# Patient Record
Sex: MALE | Race: BLACK OR AFRICAN AMERICAN | NOT HISPANIC OR LATINO | Employment: UNEMPLOYED | URBAN - METROPOLITAN AREA
[De-identification: names, ages, dates, MRNs, and addresses within clinical notes are randomized per-mention and may not be internally consistent; named-entity substitution may affect disease eponyms.]

---

## 2024-10-20 ENCOUNTER — APPOINTMENT (OUTPATIENT)
Dept: GENERAL RADIOLOGY | Facility: HOSPITAL | Age: 44
End: 2024-10-20
Payer: OTHER GOVERNMENT

## 2024-10-20 ENCOUNTER — APPOINTMENT (OUTPATIENT)
Dept: ULTRASOUND IMAGING | Facility: HOSPITAL | Age: 44
End: 2024-10-20
Payer: OTHER GOVERNMENT

## 2024-10-20 ENCOUNTER — HOSPITAL ENCOUNTER (EMERGENCY)
Facility: HOSPITAL | Age: 44
Discharge: HOME | End: 2024-10-20
Attending: EMERGENCY MEDICINE | Admitting: EMERGENCY MEDICINE
Payer: OTHER GOVERNMENT

## 2024-10-20 VITALS
DIASTOLIC BLOOD PRESSURE: 92 MMHG | SYSTOLIC BLOOD PRESSURE: 141 MMHG | TEMPERATURE: 97.4 F | BODY MASS INDEX: 30.05 KG/M2 | HEART RATE: 77 BPM | HEIGHT: 74 IN | WEIGHT: 234.13 LBS | OXYGEN SATURATION: 98 % | RESPIRATION RATE: 16 BRPM

## 2024-10-20 DIAGNOSIS — R10.13 ABDOMINAL PAIN, ACUTE, EPIGASTRIC: Primary | ICD-10-CM

## 2024-10-20 DIAGNOSIS — K21.9 GASTROESOPHAGEAL REFLUX DISEASE, UNSPECIFIED WHETHER ESOPHAGITIS PRESENT: ICD-10-CM

## 2024-10-20 LAB
ALBUMIN SERPL-MCNC: 4.3 G/DL (ref 3.5–5.2)
ALBUMIN/GLOB SERPL: 1.2 G/DL
ALP SERPL-CCNC: 109 U/L (ref 39–117)
ALT SERPL W P-5'-P-CCNC: 20 U/L (ref 1–41)
ANION GAP SERPL CALCULATED.3IONS-SCNC: 8.6 MMOL/L (ref 5–15)
AST SERPL-CCNC: 22 U/L (ref 1–40)
BASOPHILS # BLD AUTO: 0.03 10*3/MM3 (ref 0–0.2)
BASOPHILS NFR BLD AUTO: 0.4 % (ref 0–1.5)
BILIRUB SERPL-MCNC: 0.4 MG/DL (ref 0–1.2)
BUN SERPL-MCNC: 13 MG/DL (ref 6–20)
BUN/CREAT SERPL: 11.1 (ref 7–25)
CALCIUM SPEC-SCNC: 9.6 MG/DL (ref 8.6–10.5)
CHLORIDE SERPL-SCNC: 101 MMOL/L (ref 98–107)
CO2 SERPL-SCNC: 28.4 MMOL/L (ref 22–29)
CREAT SERPL-MCNC: 1.17 MG/DL (ref 0.76–1.27)
DEPRECATED RDW RBC AUTO: 42.8 FL (ref 37–54)
EGFRCR SERPLBLD CKD-EPI 2021: 79.8 ML/MIN/1.73
EOSINOPHIL # BLD AUTO: 0.09 10*3/MM3 (ref 0–0.4)
EOSINOPHIL NFR BLD AUTO: 1.2 % (ref 0.3–6.2)
ERYTHROCYTE [DISTWIDTH] IN BLOOD BY AUTOMATED COUNT: 12.8 % (ref 12.3–15.4)
GLOBULIN UR ELPH-MCNC: 3.6 GM/DL
GLUCOSE SERPL-MCNC: 91 MG/DL (ref 65–99)
HCT VFR BLD AUTO: 43.6 % (ref 37.5–51)
HGB BLD-MCNC: 14.8 G/DL (ref 13–17.7)
HOLD SPECIMEN: NORMAL
HOLD SPECIMEN: NORMAL
IMM GRANULOCYTES # BLD AUTO: 0.02 10*3/MM3 (ref 0–0.05)
IMM GRANULOCYTES NFR BLD AUTO: 0.3 % (ref 0–0.5)
LIPASE SERPL-CCNC: 30 U/L (ref 13–60)
LYMPHOCYTES # BLD AUTO: 3.16 10*3/MM3 (ref 0.7–3.1)
LYMPHOCYTES NFR BLD AUTO: 43.4 % (ref 19.6–45.3)
MAGNESIUM SERPL-MCNC: 2.2 MG/DL (ref 1.6–2.6)
MCH RBC QN AUTO: 30.9 PG (ref 26.6–33)
MCHC RBC AUTO-ENTMCNC: 33.9 G/DL (ref 31.5–35.7)
MCV RBC AUTO: 91 FL (ref 79–97)
MONOCYTES # BLD AUTO: 0.55 10*3/MM3 (ref 0.1–0.9)
MONOCYTES NFR BLD AUTO: 7.6 % (ref 5–12)
NEUTROPHILS NFR BLD AUTO: 3.43 10*3/MM3 (ref 1.7–7)
NEUTROPHILS NFR BLD AUTO: 47.1 % (ref 42.7–76)
NRBC BLD AUTO-RTO: 0 /100 WBC (ref 0–0.2)
NT-PROBNP SERPL-MCNC: <36 PG/ML (ref 0–450)
PLATELET # BLD AUTO: 219 10*3/MM3 (ref 140–450)
PMV BLD AUTO: 10 FL (ref 6–12)
POTASSIUM SERPL-SCNC: 4 MMOL/L (ref 3.5–5.2)
PROT SERPL-MCNC: 7.9 G/DL (ref 6–8.5)
RBC # BLD AUTO: 4.79 10*6/MM3 (ref 4.14–5.8)
SODIUM SERPL-SCNC: 138 MMOL/L (ref 136–145)
TROPONIN T SERPL HS-MCNC: <6 NG/L
WBC NRBC COR # BLD AUTO: 7.28 10*3/MM3 (ref 3.4–10.8)
WHOLE BLOOD HOLD COAG: NORMAL
WHOLE BLOOD HOLD SPECIMEN: NORMAL

## 2024-10-20 PROCEDURE — 71045 X-RAY EXAM CHEST 1 VIEW: CPT

## 2024-10-20 PROCEDURE — 83880 ASSAY OF NATRIURETIC PEPTIDE: CPT | Performed by: EMERGENCY MEDICINE

## 2024-10-20 PROCEDURE — 36415 COLL VENOUS BLD VENIPUNCTURE: CPT | Performed by: EMERGENCY MEDICINE

## 2024-10-20 PROCEDURE — 93005 ELECTROCARDIOGRAM TRACING: CPT | Performed by: EMERGENCY MEDICINE

## 2024-10-20 PROCEDURE — 83690 ASSAY OF LIPASE: CPT | Performed by: EMERGENCY MEDICINE

## 2024-10-20 PROCEDURE — 76705 ECHO EXAM OF ABDOMEN: CPT

## 2024-10-20 PROCEDURE — 99284 EMERGENCY DEPT VISIT MOD MDM: CPT

## 2024-10-20 PROCEDURE — 84484 ASSAY OF TROPONIN QUANT: CPT | Performed by: EMERGENCY MEDICINE

## 2024-10-20 PROCEDURE — 85025 COMPLETE CBC W/AUTO DIFF WBC: CPT | Performed by: EMERGENCY MEDICINE

## 2024-10-20 PROCEDURE — 80053 COMPREHEN METABOLIC PANEL: CPT | Performed by: EMERGENCY MEDICINE

## 2024-10-20 PROCEDURE — 83735 ASSAY OF MAGNESIUM: CPT | Performed by: EMERGENCY MEDICINE

## 2024-10-20 PROCEDURE — 93005 ELECTROCARDIOGRAM TRACING: CPT

## 2024-10-20 RX ORDER — PANTOPRAZOLE SODIUM 20 MG/1
20 TABLET, DELAYED RELEASE ORAL DAILY
Qty: 30 TABLET | Refills: 0 | Status: SHIPPED | OUTPATIENT
Start: 2024-10-20

## 2024-10-20 RX ORDER — ASPIRIN 81 MG/1
324 TABLET, CHEWABLE ORAL ONCE
Status: DISCONTINUED | OUTPATIENT
Start: 2024-10-20 | End: 2024-10-21 | Stop reason: HOSPADM

## 2024-10-20 RX ORDER — SODIUM CHLORIDE 0.9 % (FLUSH) 0.9 %
10 SYRINGE (ML) INJECTION AS NEEDED
Status: DISCONTINUED | OUTPATIENT
Start: 2024-10-20 | End: 2024-10-21 | Stop reason: HOSPADM

## 2024-10-20 NOTE — ED TRIAGE NOTES
Pt to ED from Urgent Care with reports of epigastric pain every time he eats.      Pt states pain began today.    Pt had EKG and aspirin per Urgent Care and was referred to ED for further eval.

## 2024-10-21 LAB
QT INTERVAL: 347 MS
QTC INTERVAL: 423 MS

## 2024-10-21 NOTE — ED PROVIDER NOTES
"Time: 8:34 PM EDT  Date of encounter:  10/20/2024  Independent Historian/Clinical History and Information was obtained by:   Patient    History is limited by: N/A    Chief Complaint: Abdominal pain      History of Present Illness:  Patient is a 44 y.o. year old male who presents to the emergency department for evaluation of upper abdominal pain.  Patient reports he has been experiencing this upper abdominal pain intermittently for the last couple of years.  Patient reports these episodes are usually brought on by eating \"really bad foods\".  Patient denies nausea or vomiting, denies diarrhea, but reports constipation and an increase in gas.  Patient reports the pain is in the upper middle area of his abdomen, reports it feels like it becomes hard and \"squeezing\".      Patient Care Team  Primary Care Provider: Provider, No Known    Past Medical History:     No Known Allergies  Past Medical History:   Diagnosis Date    GERD (gastroesophageal reflux disease)      History reviewed. No pertinent surgical history.  History reviewed. No pertinent family history.    Home Medications:  Prior to Admission medications    Not on File        Social History:          Review of Systems:  Review of Systems   Constitutional:  Negative for activity change, appetite change and fever.   HENT:  Negative for congestion and sore throat.    Eyes: Negative.    Respiratory:  Negative for cough and shortness of breath.    Cardiovascular:  Negative for chest pain and leg swelling.   Gastrointestinal:  Positive for abdominal pain and constipation. Negative for diarrhea and vomiting.   Endocrine: Negative.    Genitourinary:  Negative for decreased urine volume and dysuria.   Musculoskeletal:  Negative for neck pain.   Skin:  Negative for rash and wound.   Allergic/Immunologic: Negative.    Neurological:  Negative for weakness, numbness and headaches.   Hematological: Negative.    Psychiatric/Behavioral: Negative.     All other systems reviewed " "and are negative.       Physical Exam:  /92   Pulse 77   Temp 97.4 °F (36.3 °C) (Oral)   Resp 16   Ht 188 cm (74\")   Wt 106 kg (234 lb 2.1 oz)   SpO2 98%   BMI 30.06 kg/m²     Physical Exam  Vitals and nursing note reviewed.   Constitutional:       General: He is not in acute distress.     Appearance: Normal appearance. He is not toxic-appearing.   HENT:      Head: Normocephalic and atraumatic.      Jaw: There is normal jaw occlusion.   Eyes:      General: Lids are normal.      Extraocular Movements: Extraocular movements intact.      Conjunctiva/sclera: Conjunctivae normal.      Pupils: Pupils are equal, round, and reactive to light.   Cardiovascular:      Rate and Rhythm: Normal rate and regular rhythm.      Pulses: Normal pulses.      Heart sounds: Normal heart sounds.   Pulmonary:      Effort: Pulmonary effort is normal. No respiratory distress.      Breath sounds: Normal breath sounds. No wheezing or rhonchi.   Abdominal:      General: Abdomen is flat.      Palpations: Abdomen is soft.      Tenderness: There is abdominal tenderness in the epigastric area. There is no guarding or rebound.   Musculoskeletal:         General: Normal range of motion.      Cervical back: Normal range of motion and neck supple.      Right lower leg: No edema.      Left lower leg: No edema.   Skin:     General: Skin is warm and dry.   Neurological:      Mental Status: He is alert and oriented to person, place, and time. Mental status is at baseline.   Psychiatric:         Mood and Affect: Mood normal.            Procedures:  Procedures      Medical Decision Making:      Comorbidities that affect care:    GERD    External Notes reviewed:    None      The following orders were placed and all results were independently analyzed by me:  Orders Placed This Encounter   Procedures    XR Chest 1 View    US Gallbladder    [place] Draw    High Sensitivity Troponin T    Comprehensive Metabolic Panel    Lipase    BNP    Magnesium    " CBC Auto Differential    High Sensitivity Troponin T 2Hr    NPO Diet NPO Type: Strict NPO    Undress & Gown    Continuous Pulse Oximetry    Oxygen Therapy- Nasal Cannula; Titrate 1-6 LPM Per SpO2; 90 - 95%    ECG 12 Lead ED Triage Standing Order; Chest Pain    Insert Peripheral IV    CBC & Differential    Green Top (Gel)    Lavender Top    Gold Top - SST    Light Blue Top       Medications Given in the Emergency Department:  Medications   sodium chloride 0.9 % flush 10 mL (has no administration in time range)   aspirin chewable tablet 324 mg (324 mg Oral Not Given 10/20/24 1945)        ED Course:         Labs:    Lab Results (last 24 hours)       Procedure Component Value Units Date/Time    High Sensitivity Troponin T [[id number]]  (Normal) Collected: 10/20/24 1905    Specimen: Blood from Arm, Right Updated: 10/20/24 1939     HS Troponin T <6 ng/L     Narrative:      High Sensitive Troponin T Reference Range:  <14.0 ng/L- Negative Female for AMI  <22.0 ng/L- Negative Male for AMI  >=14 - Abnormal Female indicating possible myocardial injury.  >=22 - Abnormal Male indicating possible myocardial injury.   Clinicians would have to utilize clinical acumen, EKG, Troponin, and serial changes to determine if it is an Acute Myocardial Infarction or myocardial injury due to an underlying chronic condition.         CBC & Differential [[id number]]  (Abnormal) Collected: 10/20/24 1905    Specimen: Blood from Arm, Right Updated: 10/20/24 1911    Narrative:      The following orders were created for panel order CBC & Differential.  Procedure                               Abnormality         Status                     ---------                               -----------         ------                     CBC Auto Differential[[id number]]        Abnormal            Final result                 Please view results for these tests on the individual orders.    Comprehensive Metabolic Panel [[id number]] Collected: 10/20/24 1905    Specimen:  Blood from Arm, Right Updated: 10/20/24 1939     Glucose 91 mg/dL      BUN 13 mg/dL      Creatinine 1.17 mg/dL      Sodium 138 mmol/L      Potassium 4.0 mmol/L      Chloride 101 mmol/L      CO2 28.4 mmol/L      Calcium 9.6 mg/dL      Total Protein 7.9 g/dL      Albumin 4.3 g/dL      ALT (SGPT) 20 U/L      AST (SGOT) 22 U/L      Alkaline Phosphatase 109 U/L      Total Bilirubin 0.4 mg/dL      Globulin 3.6 gm/dL      A/G Ratio 1.2 g/dL      BUN/Creatinine Ratio 11.1     Anion Gap 8.6 mmol/L      eGFR 79.8 mL/min/1.73     Narrative:      GFR Normal >60  Chronic Kidney Disease <60  Kidney Failure <15      Lipase [[id number]]  (Normal) Collected: 10/20/24 1905    Specimen: Blood from Arm, Right Updated: 10/20/24 1939     Lipase 30 U/L     BNP [[id number]]  (Normal) Collected: 10/20/24 1905    Specimen: Blood from Arm, Right Updated: 10/20/24 1937     proBNP <36.0 pg/mL     Narrative:      This assay is used as an aid in the diagnosis of individuals suspected of having heart failure. It can be used as an aid in the diagnosis of acute decompensated heart failure (ADHF) in patients presenting with signs and symptoms of ADHF to the emergency department (ED). In addition, NT-proBNP of <300 pg/mL indicates ADHF is not likely.    Age Range Result Interpretation  NT-proBNP Concentration (pg/mL:      <50             Positive            >450                   Gray                 300-450                    Negative             <300    50-75           Positive            >900                  Gray                300-900                  Negative            <300      >75             Positive            >1800                  Gray                300-1800                  Negative            <300    Magnesium [[id number]]  (Normal) Collected: 10/20/24 1905    Specimen: Blood from Arm, Right Updated: 10/20/24 1939     Magnesium 2.2 mg/dL     CBC Auto Differential [[id number]]  (Abnormal) Collected: 10/20/24 1905    Specimen: Blood from  Arm, Right Updated: 10/20/24 1911     WBC 7.28 10*3/mm3      RBC 4.79 10*6/mm3      Hemoglobin 14.8 g/dL      Hematocrit 43.6 %      MCV 91.0 fL      MCH 30.9 pg      MCHC 33.9 g/dL      RDW 12.8 %      RDW-SD 42.8 fl      MPV 10.0 fL      Platelets 219 10*3/mm3      Neutrophil % 47.1 %      Lymphocyte % 43.4 %      Monocyte % 7.6 %      Eosinophil % 1.2 %      Basophil % 0.4 %      Immature Grans % 0.3 %      Neutrophils, Absolute 3.43 10*3/mm3      Lymphocytes, Absolute 3.16 10*3/mm3      Monocytes, Absolute 0.55 10*3/mm3      Eosinophils, Absolute 0.09 10*3/mm3      Basophils, Absolute 0.03 10*3/mm3      Immature Grans, Absolute 0.02 10*3/mm3      nRBC 0.0 /100 WBC              Imaging:    US Gallbladder    Result Date: 10/20/2024  US GALLBLADDER-  Date of exam: 10/20/2024, 9:08 P.M.  Indication: upper abdominal pain  Comparison: No comparisons available.  TECHNIQUE: A limited abdominal ultrasound examination of the right upper quadrant was performed, tailored in order to evaluate the gallbladder and the biliary tree. Two-dimensional (2D) grayscale (B-mode) images as well as color and spectral Doppler analysis are provided for review.  FINDINGS: Again, two-dimensional (2D) grayscale (B-mode) images as well as color and spectral Doppler analysis are provided for review. The patient was fasting as per protocol for the study. No gallstones or acute cholecystitis are seen. No gallbladder wall thickening. No pericholecystic fluid. The common bile duct measures 3.9 mm in diameter. No biliary ductal dilatation is seen. No choledocholithiasis. The pancreas is obscured by overlying bowel gas. Its visualized portions are unremarkable. No focal abnormalities are seen involving the liver or right kidney. The mcmt-ww-ptmf length of the right kidney is 10.7 cm. No right hydronephrosis. The craniocaudal dimension of the right lobe of the liver is 15.1 cm. Doppler interrogation of the hepatic vasculature reveals patent vessels  with normal blood flow direction. No abnormalities of the intrahepatic inferior vena cava (IVC) are appreciated. The left kidney, spleen, and abdominal aorta were not evaluated. A negative sonographic Lopez's sign was reported.       No acute cholecystitis. No gallstones. No biliary ductal dilatation. The other findings are as detailed above.   Portions of this note were completed with a voice recognition program.   Electronically Signed By-Onofre Wellington MD On:10/20/2024 9:55 PM      XR Chest 1 View    Result Date: 10/20/2024  XR CHEST 1 VW-  Date of exam: 10/20/2024, 7:20 P.M.  Indication: Chest pain.  Comparison: None available.  FINDINGS: A single frontal (AP or PA upright portable) chest radiograph reveals no cardiac enlargement and no acute infiltrate. No pneumothorax is seen. No pleural effusion.       No acute cardiopulmonary disease is seen radiographically.    Portions of this note were completed with a voice recognition program.   Electronically Signed By-Onofre Wellington MD On:10/20/2024 7:37 PM         Differential Diagnosis and Discussion:    Abdominal Pain: Based on the patient's signs and symptoms, I considered abdominal aortic aneurysm, small bowel obstruction, pancreatitis, acute cholecystitis, acute appendecitis, peptic ulcer disease, gastritis, colitis, endocrine disorders, irritable bowel syndrome and other differential diagnosis an etiology of the patient's abdominal pain.    All labs were reviewed and interpreted by me.  Ultrasound impression was interpreted by me.     MDM     The patient is resting comfortably and feels better, is alert and in no distress. Repeat examination is unremarkable and benign; in particular, there's no discomfort at McBurney's point and there is no pulsatile mass. The history, exam, diagnostic testing, and current condition does not suggest acute appendicitis, bowel obstruction, acute cholecystitis, bowel perforation, major gastrointestinal bleeding, severe  diverticulitis, abdominal aortic aneurysm, mesenteric ischemia, volvulus, sepsis, or other significant pathology that warrants further testing, continued ED treatment, admission, for surgical evaluation at this point. The vital signs have been stable. The patient does not have uncontrollable pain, intractable vomiting, or other significant symptoms. The patient's condition is stable and appropriate for discharge from the emergency department.                Patient Care Considerations:    CT ABDOMEN AND PELVIS: I considered ordering a CT scan of the abdomen and pelvis however no abdominal tenderness, no vomiting.      Consultants/Shared Management Plan:    None    Social Determinants of Health:    Patient is independent, reliable, and has access to care.       Disposition and Care Coordination:    Discharged: The patient is suitable and stable for discharge with no need for consideration of admission.    I have explained the patient´s condition, diagnoses and treatment plan based on the information available to me at this time. I have answered questions and addressed any concerns. The patient has a good  understanding of the patient´s diagnosis, condition, and treatment plan as can be expected at this point. The vital signs have been stable. The patient´s condition is stable and appropriate for discharge from the emergency department.      The patient will pursue further outpatient evaluation with the primary care physician or other designated or consulting physician as outlined in the discharge instructions. They are agreeable to this plan of care and follow-up instructions have been explained in detail. The patient has received these instructions in written format and has expressed an understanding of the discharge instructions. The patient is aware that any significant change in condition or worsening of symptoms should prompt an immediate return to this or the closest emergency department or call to 911.  I have  explained discharge medications and the need for follow up with the patient/caretakers. This was also printed in the discharge instructions. Patient was discharged with the following medications and follow up:      Medication List        New Prescriptions      pantoprazole 20 MG EC tablet  Commonly known as: PROTONIX  Take 1 tablet by mouth Daily.               Where to Get Your Medications        These medications were sent to [id number] - [place], [place] - [address] [facility] AT [facility] OF [address] & [place] - [phone]  - [phone] FX  [address] [facility], [facility] [address]      Phone: [phone]   pantoprazole 20 MG EC tablet      Provider, No Known  [facility]  [place] [address]    Call in 2 days         Final diagnoses:   Abdominal pain, acute, epigastric   Gastroesophageal reflux disease, unspecified whether esophagitis present        ED Disposition       ED Disposition   Discharge    Condition   Stable    Comment   --               This medical record created using voice recognition software.             Yamileth Alfaro, TYE  10/20/24 7838

## 2024-10-21 NOTE — DISCHARGE INSTRUCTIONS
Make sure to stay hydrated by drinking plenty of fluids and get some rest.  Eat frequent, small meals of bland food throughout the day.  Avoid foods that are heavy, greasy, fatty, spicy, or acidic.    Continue taking the Protonix daily and follow-up with your primary care provider.

## 2025-03-25 ENCOUNTER — TRANSCRIBE ORDERS (OUTPATIENT)
Dept: ADMINISTRATIVE | Facility: HOSPITAL | Age: 45
End: 2025-03-25
Payer: OTHER GOVERNMENT

## 2025-03-25 DIAGNOSIS — R10.9 ABDOMINAL PAIN, UNSPECIFIED ABDOMINAL LOCATION: Primary | ICD-10-CM

## 2025-03-31 ENCOUNTER — HOSPITAL ENCOUNTER (OUTPATIENT)
Dept: NUCLEAR MEDICINE | Facility: HOSPITAL | Age: 45
Discharge: HOME | End: 2025-03-31
Payer: OTHER GOVERNMENT

## 2025-03-31 DIAGNOSIS — R10.9 ABDOMINAL PAIN, UNSPECIFIED ABDOMINAL LOCATION: ICD-10-CM

## 2025-03-31 PROCEDURE — 34310000005 TECHNETIUM TC 99M MEBROFENIN KIT: Performed by: CASE MANAGER/CARE COORDINATOR

## 2025-03-31 PROCEDURE — A9537 TC99M MEBROFENIN: HCPCS | Performed by: CASE MANAGER/CARE COORDINATOR

## 2025-03-31 PROCEDURE — 78227 HEPATOBIL SYST IMAGE W/DRUG: CPT

## 2025-03-31 RX ORDER — KIT FOR THE PREPARATION OF TECHNETIUM TC 99M MEBROFENIN 45 MG/10ML
1 INJECTION, POWDER, LYOPHILIZED, FOR SOLUTION INTRAVENOUS
Status: COMPLETED | OUTPATIENT
Start: 2025-03-31 | End: 2025-03-31

## 2025-03-31 RX ADMIN — MEBROFENIN 1 DOSE: 45 INJECTION, POWDER, LYOPHILIZED, FOR SOLUTION INTRAVENOUS at 07:05

## 2025-05-07 ENCOUNTER — TELEPHONE (OUTPATIENT)
Dept: GASTROENTEROLOGY | Facility: CLINIC | Age: 45
End: 2025-05-07
Payer: OTHER GOVERNMENT

## 2025-05-07 NOTE — TELEPHONE ENCOUNTER
Attempted to call the patient about being on the cancellation, was unable to speak with patient left a message for the patient to call us back.  If it is still open.

## 2025-05-08 ENCOUNTER — OFFICE VISIT (OUTPATIENT)
Dept: GASTROENTEROLOGY | Facility: CLINIC | Age: 45
End: 2025-05-08
Payer: OTHER GOVERNMENT

## 2025-05-08 VITALS
WEIGHT: 232 LBS | BODY MASS INDEX: 29.77 KG/M2 | DIASTOLIC BLOOD PRESSURE: 79 MMHG | SYSTOLIC BLOOD PRESSURE: 130 MMHG | HEART RATE: 97 BPM | HEIGHT: 74 IN

## 2025-05-08 DIAGNOSIS — K59.04 CHRONIC IDIOPATHIC CONSTIPATION: Primary | ICD-10-CM

## 2025-05-08 DIAGNOSIS — K21.9 GASTROESOPHAGEAL REFLUX DISEASE, UNSPECIFIED WHETHER ESOPHAGITIS PRESENT: ICD-10-CM

## 2025-05-08 DIAGNOSIS — R14.0 ABDOMINAL BLOATING: ICD-10-CM

## 2025-05-08 PROBLEM — K59.00 CONSTIPATION: Status: ACTIVE | Noted: 2025-05-08

## 2025-05-08 RX ORDER — PEG-3350, SODIUM SULFATE, SODIUM CHLORIDE, POTASSIUM CHLORIDE, SODIUM ASCORBATE AND ASCORBIC ACID 7.5-2.691G
1000 KIT ORAL EVERY 12 HOURS
Qty: 2000 ML | Refills: 0 | Status: SHIPPED | OUTPATIENT
Start: 2025-05-08

## 2025-05-08 RX ORDER — CETIRIZINE HYDROCHLORIDE 10 MG/1
TABLET ORAL
COMMUNITY
Start: 2025-03-14

## 2025-05-08 NOTE — PROGRESS NOTES
Chief Complaint     Abdominal Pain, Constipation, and Bloated    Patient or patient representative verbalized consent for the use of Ambient Listening during the visit with  TYE Meier for chart documentation. 5/8/2025  08:54 EDT    History of Present Illness       History of Present Illness  The patient presents for evaluation of abdominal pain and bloating.    He reports experiencing bloating and constipation.  He experiences difficulty in passing stools and it often takes 20-25 minutes to produce a bowel movement. He has incorporated smoothies into his diet, which include MiraLAX, and has found this regimen beneficial. However, he continues to experience straining during bowel movements. He reports no presence of blood in his stool unless he exerts significant effort during defecation, a symptom he has not experienced recently. He has not previously been prescribed medication for constipation.  He maintains a regular morning workout routine and consumes protein shakes. He notes that his abdomen appears flat when lying down but becomes distended upon standing and moving. His last colonoscopy was performed 20 years ago and yielded normal results. A stool test conducted 01/2025 at [facility] however results are not available at the time of the visit.      He also reports a cessation of acid reflux symptoms following dietary modifications and pantoprazole.  Denies previous EGD.               History      Past Medical History:   Diagnosis Date    GERD (gastroesophageal reflux disease)        Past Surgical History:   Procedure Laterality Date    COLONOSCOPY         History reviewed. No pertinent family history.     Current Medications        Current Outpatient Medications:     cetirizine (zyrTEC) 10 MG tablet, , Disp: , Rfl:     pantoprazole (PROTONIX) 20 MG EC tablet, Take 1 tablet by mouth Daily., Disp: 30 tablet, Rfl: 0    linaclotide (Linzess) 145 MCG capsule capsule, Take 1 capsule by mouth Every  "Morning Before Breakfast., Disp: 90 capsule, Rfl: 1    PEG-KCl-NaCl-NaSulf-Na Asc-C (MoviPrep) 100 g reconstituted solution powder, Take 1,000 mL by mouth Every 12 (Twelve) Hours., Disp: 2000 mL, Rfl: 0     Allergies     No Known Allergies    Social History       Social History     Social History Narrative    Not on file       Immunizations     Immunization:    There is no immunization history on file for this patient.       Objective     Objective     Vital Signs:   /79 (BP Location: Left arm, Patient Position: Sitting, Cuff Size: Adult)   Pulse 97   Ht 188 cm (74.02\")   Wt 105 kg (232 lb)   BMI 29.77 kg/m²       Physical Exam    Results      Result Review :   The following data was reviewed by: TYE Meier on 05/08/2025:    CBC w/diff          10/20/2024    19:05   CBC w/Diff   WBC 7.28    RBC 4.79    Hemoglobin 14.8    Hematocrit 43.6    MCV 91.0    MCH 30.9    MCHC 33.9    RDW 12.8    Platelets 219    Neutrophil Rel % 47.1    Immature Granulocyte Rel % 0.3    Lymphocyte Rel % 43.4    Monocyte Rel % 7.6    Eosinophil Rel % 1.2    Basophil Rel % 0.4      CMP          10/20/2024    19:05   CMP   Glucose 91    BUN 13    Creatinine 1.17    EGFR 79.8    Sodium 138    Potassium 4.0    Chloride 101    Calcium 9.6    Total Protein 7.9    Albumin 4.3    Globulin 3.6    Total Bilirubin 0.4    Alkaline Phosphatase 109    AST (SGOT) 22    ALT (SGPT) 20    Albumin/Globulin Ratio 1.2    BUN/Creatinine Ratio 11.1    Anion Gap 8.6        Lipase   Lipase   Date Value Ref Range Status   10/20/2024 30 13 - 60 U/L Final       Results             Assessment and Plan        Assessment and Plan    Diagnoses and all orders for this visit:    1. Chronic idiopathic constipation (Primary)  -     linaclotide (Linzess) 145 MCG capsule capsule; Take 1 capsule by mouth Every Morning Before Breakfast.  Dispense: 90 capsule; Refill: 1  -     Case Request; Standing  -     Case Request    2. Abdominal bloating  -     Case " Request; Standing  -     Case Request    3. Gastroesophageal reflux disease, unspecified whether esophagitis present  -     Case Request; Standing  -     Case Request    Other orders  -     Follow Anesthesia Guidelines / Protocol; Future  -     Verify NPO; Standing  -     Verify Bowel Prep Was Successful; Standing  -     Give Tap Water Enema If Bowel Prep Insufficient; Standing  -     PEG-KCl-NaCl-NaSulf-Na Asc-C (MoviPrep) 100 g reconstituted solution powder; Take 1,000 mL by mouth Every 12 (Twelve) Hours.  Dispense: 2000 mL; Refill: 0        Assessment & Plan  1. Abdominal distension:  - Initiate Linzess, 1 tablet daily on an empty stomach, with a minimum interval of one hour before consuming food.  - Prescription for Linzess sent to pharmacy; sample provided for immediate use.  - Provided information sheet detailing Linzess use.  - Informed about potential side effect of diarrhea, typically manifesting within the first few weeks of treatment and subsequently improving.  - Reassured that protein shake consumption is not contributing to symptoms.  - If no improvement after 2 weeks, inform for dosage adjustment.    2. Constipation:  - Reports chronic constipation, requiring significant effort for bowel movements.  - Using Metamucil and MiraLAX in smoothies, providing some relief.  - Start Linzess to help with regular bowel movements and reduce bloating.  - If symptoms persist after 2 weeks, dosage may be increased.    3. Gastroesophageal reflux disease:  - Reports improvement in acid reflux symptoms since changing diet.  - Taking pantoprazole for about 6 months.  - Continue current dietary modifications and medication regimen.    4. Health maintenance:  - Colonoscopy and upper endoscopy scheduled to investigate gastrointestinal symptoms.  - Informed about bowel preparation prior to the procedure and need for a designated  post-procedure due to sedation.      ESOPHAGOGASTRODUODENOSCOPY (N/A), COLONOSCOPY  (N/A)  The risk of the endoscopy were discussed in detail. Possible risks/complications, benefits, and alternatives to surgical or invasive procedure have been explained to patient and/or legal guardian; risks include bleeding, infection, and perforation. Patient has been evaluated and can tolerate anesthesia and/or sedation.       Follow Up        Follow Up   Return in about 6 months (around 11/8/2025) for GERD, constipation.  Patient was given instructions and counseling regarding his condition or for health maintenance advice. Please see specific information pulled into the AVS if appropriate.

## 2025-05-09 ENCOUNTER — TELEPHONE (OUTPATIENT)
Dept: OTHER | Facility: HOSPITAL | Age: 45
End: 2025-05-09
Payer: OTHER GOVERNMENT

## 2025-05-09 ENCOUNTER — TELEPHONE (OUTPATIENT)
Dept: GASTROENTEROLOGY | Facility: CLINIC | Age: 45
End: 2025-05-09
Payer: OTHER GOVERNMENT

## 2025-05-09 NOTE — TELEPHONE ENCOUNTER
Pa for linzess started     KEY: [id number]       Protopic Pregnancy And Lactation Text: This medication is Pregnancy Category C. It is unknown if this medication is excreted in breast milk when applied topically.

## 2025-05-09 NOTE — TELEPHONE ENCOUNTER
ENDO RECONCILIATION  Verify source of procedure(s): Office visit  If other, please list source: 5/8/25    TIME OUT-CONFIRM CORRECT PROCEDURE: EGD & Colonoscopy  Cardiology:  Pulmonology:  Blood thinner:   GLP-1:  Additional DX/indication for procedure:     Please include any other notes relevant to endo reconciliation:  N/A

## 2025-06-19 NOTE — PAT
Left message on voicemail with arrival time of  1030.    Come to [facility], entrance [address]. Bring picture ID and insurance card, have  over 18 to give ride home.     Bring medication list but do not take any meds except inhalers that morning. Bring medications with you to the hospital, including inhalers.     Complete prep prior to arrival. Clear liquids all day the day before, and start prep as instructed.     Complete prep and any water may need to drink at least 2 hours prior to arrival. No gum, mints, water, or anything else in mouth after that.      Plan to be here at least 3-4 hours. Do not bring any valuables with you to the hospital.     Call [phone] with any questions.

## 2025-06-20 NOTE — PAT
Pt verifies received previous message, verbalizes understanding of instructions, verifies arrival time of  1030.  Will be here for procedure and has  lined up.

## 2025-06-27 ENCOUNTER — ANESTHESIA EVENT (OUTPATIENT)
Dept: GASTROENTEROLOGY | Facility: HOSPITAL | Age: 45
End: 2025-06-27
Payer: OTHER GOVERNMENT

## 2025-06-27 NOTE — ANESTHESIA PREPROCEDURE EVALUATION
Anesthesia Evaluation     Patient summary reviewed and Nursing notes reviewed   NPO Solid Status: > 8 hours  NPO Liquid Status: > 8 hours           Airway   Mallampati: II  TM distance: >3 FB  Neck ROM: full  No difficulty expected  Comment: Full beard  Dental          Pulmonary - normal exam    breath sounds clear to auscultation  Cardiovascular - normal exam  Exercise tolerance: good (4-7 METS)    ECG reviewed  Rhythm: regular  Rate: normal      ROS comment: EKG 10/20/24  HEART RATE=89  bpm  RR Soihnkit=646  ms  IN Vxlwkgvk=791  ms  P Horizontal Axis=31  deg  P Front Axis=40  deg  QRSD Interval=87  ms  QT Fbomfmpu=096  ms  BZhS=365  ms  QRS Axis=4  deg  T Wave Axis=34  deg  - BORDERLINE ECG -  Sinus rhythm  Borderline  ST elevation, anterior leads  No previous ECG available for comparison  Electronically Signed By: Matt Brooks ([facility]) 2024-10-21 22:58:52  Date and Time of Study:2024-10-20 18:57:18          Neuro/Psych- negative ROS  GI/Hepatic/Renal/Endo    (+) GERD well controlled    Musculoskeletal (-) negative ROS    Abdominal  - normal exam    Abdomen: soft.   Substance History - negative use     OB/GYN          Other - negative ROS                     Anesthesia Plan    ASA 2     general     (Total IV Anesthesia    Patient understands anesthesia not responsible for dental damage.  )  intravenous induction     Anesthetic plan, risks, benefits, and alternatives have been provided, discussed and informed consent has been obtained with: patient.  Pre-procedure education provided  Plan discussed with CRNA.      CODE STATUS:

## 2025-06-30 ENCOUNTER — HOSPITAL ENCOUNTER (OUTPATIENT)
Facility: HOSPITAL | Age: 45
Setting detail: HOSPITAL OUTPATIENT SURGERY
Discharge: HOME | End: 2025-06-30
Attending: INTERNAL MEDICINE | Admitting: INTERNAL MEDICINE
Payer: OTHER GOVERNMENT

## 2025-06-30 ENCOUNTER — TELEPHONE (OUTPATIENT)
Dept: GASTROENTEROLOGY | Facility: CLINIC | Age: 45
End: 2025-06-30
Payer: OTHER GOVERNMENT

## 2025-06-30 ENCOUNTER — ANESTHESIA (OUTPATIENT)
Dept: GASTROENTEROLOGY | Facility: HOSPITAL | Age: 45
End: 2025-06-30
Payer: OTHER GOVERNMENT

## 2025-06-30 VITALS
TEMPERATURE: 96.9 F | HEIGHT: 77 IN | RESPIRATION RATE: 18 BRPM | HEART RATE: 63 BPM | DIASTOLIC BLOOD PRESSURE: 76 MMHG | SYSTOLIC BLOOD PRESSURE: 141 MMHG | BODY MASS INDEX: 27.38 KG/M2 | OXYGEN SATURATION: 99 % | WEIGHT: 231.92 LBS

## 2025-06-30 DIAGNOSIS — K59.04 CHRONIC IDIOPATHIC CONSTIPATION: ICD-10-CM

## 2025-06-30 DIAGNOSIS — K21.9 GASTROESOPHAGEAL REFLUX DISEASE, UNSPECIFIED WHETHER ESOPHAGITIS PRESENT: ICD-10-CM

## 2025-06-30 DIAGNOSIS — R14.0 ABDOMINAL BLOATING: ICD-10-CM

## 2025-06-30 PROCEDURE — 25010000002 PROPOFOL 10 MG/ML EMULSION: Performed by: NURSE ANESTHETIST, CERTIFIED REGISTERED

## 2025-06-30 PROCEDURE — 25810000003 LACTATED RINGERS PER 1000 ML: Performed by: NURSE ANESTHETIST, CERTIFIED REGISTERED

## 2025-06-30 PROCEDURE — 25010000002 LIDOCAINE PF 2% 2 % SOLUTION: Performed by: NURSE ANESTHETIST, CERTIFIED REGISTERED

## 2025-06-30 PROCEDURE — 25010000002 PHENYLEPHRINE HCL-NACL 1000-0.9 MCG/10ML-% SOLUTION PREFILLED SYRINGE: Performed by: NURSE ANESTHETIST, CERTIFIED REGISTERED

## 2025-06-30 PROCEDURE — 88305 TISSUE EXAM BY PATHOLOGIST: CPT | Performed by: INTERNAL MEDICINE

## 2025-06-30 RX ORDER — LIDOCAINE HYDROCHLORIDE 20 MG/ML
INJECTION, SOLUTION EPIDURAL; INFILTRATION; INTRACAUDAL; PERINEURAL AS NEEDED
Status: DISCONTINUED | OUTPATIENT
Start: 2025-06-30 | End: 2025-06-30 | Stop reason: SURG

## 2025-06-30 RX ORDER — PHENYLEPHRINE HCL IN 0.9% NACL 1 MG/10 ML
SYRINGE (ML) INTRAVENOUS AS NEEDED
Status: DISCONTINUED | OUTPATIENT
Start: 2025-06-30 | End: 2025-06-30 | Stop reason: SURG

## 2025-06-30 RX ORDER — SODIUM CHLORIDE, SODIUM LACTATE, POTASSIUM CHLORIDE, CALCIUM CHLORIDE 600; 310; 30; 20 MG/100ML; MG/100ML; MG/100ML; MG/100ML
30 INJECTION, SOLUTION INTRAVENOUS CONTINUOUS
Status: DISCONTINUED | OUTPATIENT
Start: 2025-06-30 | End: 2025-06-30 | Stop reason: HOSPADM

## 2025-06-30 RX ORDER — SODIUM CHLORIDE, SODIUM LACTATE, POTASSIUM CHLORIDE, CALCIUM CHLORIDE 600; 310; 30; 20 MG/100ML; MG/100ML; MG/100ML; MG/100ML
INJECTION, SOLUTION INTRAVENOUS CONTINUOUS PRN
Status: DISCONTINUED | OUTPATIENT
Start: 2025-06-30 | End: 2025-06-30 | Stop reason: SURG

## 2025-06-30 RX ORDER — PROPOFOL 10 MG/ML
VIAL (ML) INTRAVENOUS AS NEEDED
Status: DISCONTINUED | OUTPATIENT
Start: 2025-06-30 | End: 2025-06-30 | Stop reason: SURG

## 2025-06-30 RX ORDER — EPHEDRINE SULFATE 50 MG/ML
INJECTION INTRAVENOUS AS NEEDED
Status: DISCONTINUED | OUTPATIENT
Start: 2025-06-30 | End: 2025-06-30 | Stop reason: SURG

## 2025-06-30 RX ADMIN — EPHEDRINE SULFATE 10 MG: 50 INJECTION INTRAVENOUS at 14:35

## 2025-06-30 RX ADMIN — PROPOFOL 175 MCG/KG/MIN: 10 INJECTION, EMULSION INTRAVENOUS at 14:08

## 2025-06-30 RX ADMIN — SODIUM CHLORIDE, POTASSIUM CHLORIDE, SODIUM LACTATE AND CALCIUM CHLORIDE 30 ML/HR: 600; 310; 30; 20 INJECTION, SOLUTION INTRAVENOUS at 11:22

## 2025-06-30 RX ADMIN — SODIUM CHLORIDE, POTASSIUM CHLORIDE, SODIUM LACTATE AND CALCIUM CHLORIDE: 600; 310; 30; 20 INJECTION, SOLUTION INTRAVENOUS at 14:05

## 2025-06-30 RX ADMIN — PROPOFOL 50 MG: 10 INJECTION, EMULSION INTRAVENOUS at 14:07

## 2025-06-30 RX ADMIN — PROPOFOL 50 MG: 10 INJECTION, EMULSION INTRAVENOUS at 14:19

## 2025-06-30 RX ADMIN — Medication 200 MCG: at 14:33

## 2025-06-30 RX ADMIN — LIDOCAINE HYDROCHLORIDE 100 MG: 20 INJECTION, SOLUTION INTRAVENOUS at 14:07

## 2025-06-30 NOTE — H&P
"Pre Procedure History & Physical    Chief Complaint:   GERD, bloating, constipation    Subjective     HPI:   44 yo M here for eval of GERD, bloating, constipation.    Past Medical History:   Past Medical History:   Diagnosis Date    GERD (gastroesophageal reflux disease)        Past Surgical History:  Past Surgical History:   Procedure Laterality Date    COLONOSCOPY      ENDOSCOPY         Family History:  History reviewed. No pertinent family history.    Social History:   reports that he has never smoked. He has never used smokeless tobacco. He reports that he does not drink alcohol and does not use drugs.    Medications:   Medications Prior to Admission   Medication Sig Dispense Refill Last Dose/Taking    linaclotide (Linzess) 145 MCG capsule capsule Take 1 capsule by mouth Every Morning Before Breakfast. 90 capsule 1 Past Week    pantoprazole (PROTONIX) 20 MG EC tablet Take 1 tablet by mouth Daily. 30 tablet 0 6/29/2025    cetirizine (zyrTEC) 10 MG tablet    Unknown       Allergies:  Patient has no known allergies.    ROS:    Pertinent items are noted in HPI     Objective     Blood pressure 141/92, pulse 66, temperature 97.9 °F (36.6 °C), temperature source Temporal, resp. rate 17, height 195.6 cm (77\"), weight 105 kg (231 lb 14.8 oz), SpO2 99%.    Physical Exam   Constitutional: Pt is oriented to person, place, and time and well-developed, well-nourished, and in no distress.   Mouth/Throat: Oropharynx is clear and moist.   Neck: Normal range of motion.   Cardiovascular: Normal rate, regular rhythm and normal heart sounds.    Pulmonary/Chest: Effort normal and breath sounds normal.   Abdominal: Soft. Nontender  Skin: Skin is warm and dry.   Psychiatric: Mood, memory, affect and judgment normal.     Assessment & Plan     Diagnosis:  GERD, bloating, constipation    Anticipated Surgical Procedure:  EGD/colonoscopy    The risks, benefits, and alternatives of this procedure have been discussed with the patient or the " responsible party- the patient understands and agrees to proceed.

## 2025-06-30 NOTE — ANESTHESIA POSTPROCEDURE EVALUATION
Patient: Salvador Srivastava    Procedure Summary       Date: 06/30/25 Room / Location: [facility] 2 / [facility]    Anesthesia Start: 1405 Anesthesia Stop: 1445    Procedures:       ESOPHAGOGASTRODUODENOSCOPY WITH BIOPSIES      COLONOSCOPY Diagnosis:       Chronic idiopathic constipation      Abdominal bloating      Gastroesophageal reflux disease, unspecified whether esophagitis present      (Chronic idiopathic constipation [K59.04])      (Abdominal bloating [R14.0])      (Gastroesophageal reflux disease, unspecified whether esophagitis present [K21.9])    Surgeons: Tarah Olson MD Provider: Ben Krause CRNA    Anesthesia Type: general ASA Status: 2            Anesthesia Type: general    Vitals  Vitals Value Taken Time   /76 06/30/25 15:14   Temp 36.1 °C (96.9 °F) 06/30/25 14:45   Pulse 63 06/30/25 15:15   Resp 15 06/30/25 15:05   SpO2 99 % 06/30/25 15:14   Vitals shown include unfiled device data.        Post Anesthesia Care and Evaluation    Patient location during evaluation: bedside  Patient participation: complete - patient participated  Level of consciousness: awake  Pain management: adequate    Airway patency: patent  Anesthetic complications: No anesthetic complications  PONV Status: controlled  Cardiovascular status: acceptable and stable  Respiratory status: acceptable

## 2025-07-02 ENCOUNTER — RESULTS FOLLOW-UP (OUTPATIENT)
Dept: GASTROENTEROLOGY | Facility: HOSPITAL | Age: 45
End: 2025-07-02
Payer: OTHER GOVERNMENT

## 2025-07-02 LAB
CYTO UR: NORMAL
LAB AP CASE REPORT: NORMAL
LAB AP CLINICAL INFORMATION: NORMAL
PATH REPORT.FINAL DX SPEC: NORMAL
PATH REPORT.GROSS SPEC: NORMAL

## (undated) DEVICE — DEFENDO AIR WATER SUCTION AND BIOPSY VALVE KIT FOR  OLYMPUS: Brand: DEFENDO AIR/WATER/SUCTION AND BIOPSY VALVE

## (undated) DEVICE — SOL IRRG H2O PL/BG 1000ML STRL

## (undated) DEVICE — Device

## (undated) DEVICE — LINER SURG CANSTR SXN S/RIGD 1500CC

## (undated) DEVICE — THE STERILE LIGHT HANDLE COVER IS USED WITH STERIS SURGICAL LIGHTING AND VISUALIZATION SYSTEMS.

## (undated) DEVICE — SOLIDIFIER LIQLOC PLS 1500CC BT

## (undated) DEVICE — BLCK/BITE BLOX WO/DENTL/RIM W/STRAP 54F

## (undated) DEVICE — CONN JET HYDRA H20 AUXILIARY DISP